# Patient Record
Sex: MALE | Race: ASIAN | NOT HISPANIC OR LATINO
[De-identification: names, ages, dates, MRNs, and addresses within clinical notes are randomized per-mention and may not be internally consistent; named-entity substitution may affect disease eponyms.]

---

## 2022-01-01 PROBLEM — Z00.00 ENCOUNTER FOR PREVENTIVE HEALTH EXAMINATION: Status: ACTIVE | Noted: 2022-01-01

## 2022-01-18 ENCOUNTER — APPOINTMENT (OUTPATIENT)
Dept: CARDIOLOGY | Facility: CLINIC | Age: 52
End: 2022-01-18
Payer: MEDICAID

## 2022-01-18 VITALS
WEIGHT: 188 LBS | OXYGEN SATURATION: 95 % | HEIGHT: 66.93 IN | SYSTOLIC BLOOD PRESSURE: 133 MMHG | TEMPERATURE: 97.9 F | DIASTOLIC BLOOD PRESSURE: 89 MMHG | RESPIRATION RATE: 18 BRPM | HEART RATE: 85 BPM | BODY MASS INDEX: 29.51 KG/M2

## 2022-01-18 DIAGNOSIS — R07.89 OTHER CHEST PAIN: ICD-10-CM

## 2022-01-18 DIAGNOSIS — Z72.89 OTHER PROBLEMS RELATED TO LIFESTYLE: ICD-10-CM

## 2022-01-18 DIAGNOSIS — F17.200 NICOTINE DEPENDENCE, UNSPECIFIED, UNCOMPLICATED: ICD-10-CM

## 2022-01-18 DIAGNOSIS — I10 ESSENTIAL (PRIMARY) HYPERTENSION: ICD-10-CM

## 2022-01-18 PROCEDURE — 99204 OFFICE O/P NEW MOD 45 MIN: CPT | Mod: 25

## 2022-01-18 PROCEDURE — 93015 CV STRESS TEST SUPVJ I&R: CPT

## 2022-01-18 PROCEDURE — 99214 OFFICE O/P EST MOD 30 MIN: CPT | Mod: 25

## 2022-01-18 PROCEDURE — 93306 TTE W/DOPPLER COMPLETE: CPT

## 2022-01-18 PROCEDURE — 99072 ADDL SUPL MATRL&STAF TM PHE: CPT

## 2022-01-18 RX ORDER — LOSARTAN POTASSIUM 50 MG/1
50 TABLET, FILM COATED ORAL
Refills: 0 | Status: ACTIVE | COMMUNITY

## 2022-01-18 NOTE — HISTORY OF PRESENT ILLNESS
[FreeTextEntry1] : 51 year old male w. PMH of HTN presents for evaluation of CP. Pt reports episodes of epigastric discomfort ass. w. SOB and fast heart beats (HR 80s-90s w. palpation) 1-2 years ago. It usually occurred when he was tired. His work was more stressful at that time. Pt feels OK recently. He just feels he need to take deep breath sometimes. Pt denies h/o syncope.\par

## 2022-01-30 PROBLEM — R07.89 CHEST DISCOMFORT: Status: ACTIVE | Noted: 2022-01-18

## 2022-01-30 PROBLEM — I10 HTN (HYPERTENSION): Status: ACTIVE | Noted: 2022-01-30
